# Patient Record
Sex: FEMALE
[De-identification: names, ages, dates, MRNs, and addresses within clinical notes are randomized per-mention and may not be internally consistent; named-entity substitution may affect disease eponyms.]

---

## 2023-01-28 ENCOUNTER — NON-APPOINTMENT (OUTPATIENT)
Age: 48
End: 2023-01-28

## 2023-03-07 ENCOUNTER — RX ONLY (RX ONLY)
Age: 48
End: 2023-03-07

## 2023-03-07 ENCOUNTER — OFFICE (OUTPATIENT)
Dept: URBAN - METROPOLITAN AREA CLINIC 113 | Facility: CLINIC | Age: 48
Setting detail: OPHTHALMOLOGY
End: 2023-03-07
Payer: COMMERCIAL

## 2023-03-07 DIAGNOSIS — H40.032: ICD-10-CM

## 2023-03-07 DIAGNOSIS — D31.02: ICD-10-CM

## 2023-03-07 DIAGNOSIS — H40.033: ICD-10-CM

## 2023-03-07 DIAGNOSIS — H52.7: ICD-10-CM

## 2023-03-07 DIAGNOSIS — D35.2: ICD-10-CM

## 2023-03-07 DIAGNOSIS — H40.031: ICD-10-CM

## 2023-03-07 PROCEDURE — 92250 FUNDUS PHOTOGRAPHY W/I&R: CPT | Performed by: STUDENT IN AN ORGANIZED HEALTH CARE EDUCATION/TRAINING PROGRAM

## 2023-03-07 PROCEDURE — 99204 OFFICE O/P NEW MOD 45 MIN: CPT | Performed by: STUDENT IN AN ORGANIZED HEALTH CARE EDUCATION/TRAINING PROGRAM

## 2023-03-07 PROCEDURE — 92015 DETERMINE REFRACTIVE STATE: CPT | Performed by: STUDENT IN AN ORGANIZED HEALTH CARE EDUCATION/TRAINING PROGRAM

## 2023-03-07 PROCEDURE — 92020 GONIOSCOPY: CPT | Performed by: STUDENT IN AN ORGANIZED HEALTH CARE EDUCATION/TRAINING PROGRAM

## 2023-03-07 ASSESSMENT — CONFRONTATIONAL VISUAL FIELD TEST (CVF)
OS_FINDINGS: FULL
OD_FINDINGS: FULL

## 2023-03-07 ASSESSMENT — SPHEQUIV_DERIVED
OD_SPHEQUIV: 0.625
OD_SPHEQUIV: 0.625
OS_SPHEQUIV: 0.25
OS_SPHEQUIV: 0.25

## 2023-03-07 ASSESSMENT — REFRACTION_CURRENTRX
OD_SPHERE: +2.00
OS_OVR_VA: 20/
OD_OVR_VA: 20/
OD_AXIS: 111
OS_CYLINDER: -0.25
OS_AXIS: 081
OD_CYLINDER: -0.75
OS_SPHERE: +1.25

## 2023-03-07 ASSESSMENT — REFRACTION_MANIFEST
OS_ADD: +1.00
OS_SPHERE: +1.00
OS_CYLINDER: -1.50
OS_VA2: 20/20
OD_VA1: 20/20
OS_VA1: 20/20
OD_VA2: 20/20
OD_ADD: +1.00
OD_SPHERE: +1.00
OS_AXIS: 139
OU_VA: 20/20
OD_AXIS: 067
OD_CYLINDER: -0.75

## 2023-03-07 ASSESSMENT — AXIALLENGTH_DERIVED
OS_AL: 22.6182
OS_AL: 22.6182
OD_AL: 22.6951
OD_AL: 22.6951

## 2023-03-07 ASSESSMENT — REFRACTION_AUTOREFRACTION
OD_SPHERE: +1.00
OD_CYLINDER: -0.75
OD_AXIS: 067
OS_AXIS: 139
OS_CYLINDER: -1.50
OS_SPHERE: +1.00

## 2023-03-07 ASSESSMENT — TONOMETRY
OS_IOP_MMHG: 11
OD_IOP_MMHG: 12

## 2023-03-07 ASSESSMENT — KERATOMETRY
OD_AXISANGLE_DEGREES: 117
OS_K2POWER_DIOPTERS: 46.75
OD_K2POWER_DIOPTERS: 45.75
OD_K1POWER_DIOPTERS: 45.00
OS_AXISANGLE_DEGREES: 063
OS_K1POWER_DIOPTERS: 45.25

## 2023-03-07 ASSESSMENT — VISUAL ACUITY
OD_BCVA: 20/25+1
OS_BCVA: 20/25+1

## 2023-04-27 ENCOUNTER — OFFICE (OUTPATIENT)
Dept: URBAN - METROPOLITAN AREA CLINIC 113 | Facility: CLINIC | Age: 48
Setting detail: OPHTHALMOLOGY
End: 2023-04-27
Payer: COMMERCIAL

## 2023-04-27 ENCOUNTER — RX ONLY (RX ONLY)
Age: 48
End: 2023-04-27

## 2023-04-27 DIAGNOSIS — D31.02: ICD-10-CM

## 2023-04-27 DIAGNOSIS — H11.442: ICD-10-CM

## 2023-04-27 PROCEDURE — 92285 EXTERNAL OCULAR PHOTOGRAPHY: CPT | Performed by: OPHTHALMOLOGY

## 2023-04-27 PROCEDURE — 92014 COMPRE OPH EXAM EST PT 1/>: CPT | Performed by: OPHTHALMOLOGY

## 2023-04-27 ASSESSMENT — AXIALLENGTH_DERIVED
OS_AL: 22.6182
OS_AL: 22.6182
OD_AL: 22.6951
OD_AL: 22.6951

## 2023-04-27 ASSESSMENT — TONOMETRY
OS_IOP_MMHG: 11
OD_IOP_MMHG: 11

## 2023-04-27 ASSESSMENT — KERATOMETRY
OS_AXISANGLE_DEGREES: 063
OS_K2POWER_DIOPTERS: 46.75
OD_K1POWER_DIOPTERS: 45.00
OD_AXISANGLE_DEGREES: 117
OD_K2POWER_DIOPTERS: 45.75
OS_K1POWER_DIOPTERS: 45.25

## 2023-04-27 ASSESSMENT — CONFRONTATIONAL VISUAL FIELD TEST (CVF)
OS_FINDINGS: FULL
OD_FINDINGS: FULL

## 2023-04-27 ASSESSMENT — REFRACTION_MANIFEST
OS_ADD: +1.00
OD_CYLINDER: -0.75
OD_SPHERE: +1.00
OS_SPHERE: +1.00
OS_VA2: 20/20
OD_VA1: 20/20
OU_VA: 20/20
OS_AXIS: 139
OD_ADD: +1.00
OD_AXIS: 067
OS_VA1: 20/20
OS_CYLINDER: -1.50
OD_VA2: 20/20

## 2023-04-27 ASSESSMENT — VISUAL ACUITY
OS_BCVA: 20/30
OD_BCVA: 20/20

## 2023-04-27 ASSESSMENT — REFRACTION_AUTOREFRACTION
OS_AXIS: 139
OD_CYLINDER: -0.75
OS_CYLINDER: -1.50
OS_SPHERE: +1.00
OD_AXIS: 067
OD_SPHERE: +1.00

## 2023-04-27 ASSESSMENT — REFRACTION_CURRENTRX
OS_AXIS: 081
OS_OVR_VA: 20/
OS_SPHERE: +1.25
OS_CYLINDER: -0.25
OD_AXIS: 111
OD_SPHERE: +2.00
OD_CYLINDER: -0.75
OD_OVR_VA: 20/

## 2023-04-27 ASSESSMENT — SPHEQUIV_DERIVED
OD_SPHEQUIV: 0.625
OD_SPHEQUIV: 0.625
OS_SPHEQUIV: 0.25
OS_SPHEQUIV: 0.25

## 2024-06-05 ENCOUNTER — APPOINTMENT (OUTPATIENT)
Dept: OBGYN | Facility: CLINIC | Age: 49
End: 2024-06-05
Payer: COMMERCIAL

## 2024-06-05 VITALS
WEIGHT: 145 LBS | SYSTOLIC BLOOD PRESSURE: 129 MMHG | HEIGHT: 67 IN | DIASTOLIC BLOOD PRESSURE: 76 MMHG | BODY MASS INDEX: 22.76 KG/M2 | HEART RATE: 73 BPM

## 2024-06-05 DIAGNOSIS — Z12.39 ENCOUNTER FOR OTHER SCREENING FOR MALIGNANT NEOPLASM OF BREAST: ICD-10-CM

## 2024-06-05 DIAGNOSIS — Z80.0 FAMILY HISTORY OF MALIGNANT NEOPLASM OF DIGESTIVE ORGANS: ICD-10-CM

## 2024-06-05 DIAGNOSIS — R10.2 PELVIC AND PERINEAL PAIN: ICD-10-CM

## 2024-06-05 DIAGNOSIS — Z01.419 ENCOUNTER FOR GYNECOLOGICAL EXAMINATION (GENERAL) (ROUTINE) W/OUT ABNORMAL FINDINGS: ICD-10-CM

## 2024-06-05 DIAGNOSIS — Z30.431 ENCOUNTER FOR ROUTINE CHECKING OF INTRAUTERINE CONTRACEPTIVE DEVICE: ICD-10-CM

## 2024-06-05 DIAGNOSIS — Z78.9 OTHER SPECIFIED HEALTH STATUS: ICD-10-CM

## 2024-06-05 DIAGNOSIS — Z87.891 PERSONAL HISTORY OF NICOTINE DEPENDENCE: ICD-10-CM

## 2024-06-05 DIAGNOSIS — N39.0 URINARY TRACT INFECTION, SITE NOT SPECIFIED: ICD-10-CM

## 2024-06-05 DIAGNOSIS — Z82.49 FAMILY HISTORY OF ISCHEMIC HEART DISEASE AND OTHER DISEASES OF THE CIRCULATORY SYSTEM: ICD-10-CM

## 2024-06-05 DIAGNOSIS — Z86.018 PERSONAL HISTORY OF OTHER BENIGN NEOPLASM: ICD-10-CM

## 2024-06-05 DIAGNOSIS — Z82.0 FAMILY HISTORY OF EPILEPSY AND OTHER DISEASES OF THE NERVOUS SYSTEM: ICD-10-CM

## 2024-06-05 PROBLEM — Z00.00 ENCOUNTER FOR PREVENTIVE HEALTH EXAMINATION: Status: ACTIVE | Noted: 2024-06-05

## 2024-06-05 PROCEDURE — 99386 PREV VISIT NEW AGE 40-64: CPT

## 2024-06-05 PROCEDURE — 99214 OFFICE O/P EST MOD 30 MIN: CPT | Mod: 25

## 2024-06-05 RX ORDER — ESTRADIOL 0.1 MG/G
0.1 CREAM VAGINAL
Qty: 1 | Refills: 1 | Status: ACTIVE | COMMUNITY
Start: 2024-06-05 | End: 1900-01-01

## 2024-06-07 NOTE — PLAN
[FreeTextEntry1] : Recurrent urinary tract infections.  Discussed with the patient a trial of vaginal estrogens which have been indicated to reduce the incidence of recurrent urinary tract infections.  Vaginal Estrace is prescribed.  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  We discussed typical management of prolactinoma as.  We also discussed risks and benefits of double gadolinium contrast agents used and MRIs and indications for repeat MRIs.  We spent 35 minutes in consultation.

## 2024-06-07 NOTE — HISTORY OF PRESENT ILLNESS
[FreeTextEntry1] : 49 yo  [id number]  lmp 2 weeks ago, has only had 5 periods in past 5 yrs w kyleena. lmp had severe nipple discharge pt here for gyn visit.  wants to have iud replaced.  shanice harris md wouldn't replace without letter of med necessity lives in [place] meds- cabergoline  for pituitary tumor, adderall prn has kyleena , expires in june.  nkda surg hx- rhinoplasty after broken nose had d and c  dxd w pituitary prolactinoma, dxd w lactation.  (dxd from migraines and strokelike symptoms (microadenoma)) was advised against pregnancy unless atypical  migraine issues are resolved.  sees [facility] specialist.  co recurrent uti.

## 2024-07-15 RX ORDER — MISOPROSTOL 200 UG/1
200 TABLET ORAL
Qty: 2 | Refills: 0 | Status: ACTIVE | COMMUNITY
Start: 2024-07-15 | End: 1900-01-01

## 2024-07-17 ENCOUNTER — APPOINTMENT (OUTPATIENT)
Dept: OBGYN | Facility: CLINIC | Age: 49
End: 2024-07-17
Payer: COMMERCIAL

## 2024-07-17 VITALS
SYSTOLIC BLOOD PRESSURE: 107 MMHG | DIASTOLIC BLOOD PRESSURE: 70 MMHG | HEIGHT: 67 IN | BODY MASS INDEX: 22.76 KG/M2 | WEIGHT: 145 LBS | HEART RATE: 83 BPM

## 2024-07-17 DIAGNOSIS — R68.82 DECREASED LIBIDO: ICD-10-CM

## 2024-07-17 DIAGNOSIS — N64.4 MASTODYNIA: ICD-10-CM

## 2024-07-17 LAB
HCG UR QL: NEGATIVE
QUALITY CONTROL: YES

## 2024-07-17 PROCEDURE — 58301 REMOVE INTRAUTERINE DEVICE: CPT

## 2024-07-17 PROCEDURE — 58300 INSERT INTRAUTERINE DEVICE: CPT

## 2024-07-17 PROCEDURE — 81025 URINE PREGNANCY TEST: CPT

## 2024-07-17 PROCEDURE — 99213 OFFICE O/P EST LOW 20 MIN: CPT | Mod: 25

## 2024-09-03 ENCOUNTER — APPOINTMENT (OUTPATIENT)
Dept: ANTEPARTUM | Facility: CLINIC | Age: 49
End: 2024-09-03

## 2024-09-03 ENCOUNTER — APPOINTMENT (OUTPATIENT)
Dept: OBGYN | Facility: CLINIC | Age: 49
End: 2024-09-03

## 2024-09-16 ENCOUNTER — APPOINTMENT (OUTPATIENT)
Age: 49
End: 2024-09-16

## 2025-06-16 ENCOUNTER — APPOINTMENT (OUTPATIENT)
Dept: OBGYN | Facility: CLINIC | Age: 50
End: 2025-06-16
Payer: COMMERCIAL

## 2025-06-16 VITALS
SYSTOLIC BLOOD PRESSURE: 110 MMHG | HEIGHT: 67 IN | WEIGHT: 150 LBS | BODY MASS INDEX: 23.54 KG/M2 | DIASTOLIC BLOOD PRESSURE: 60 MMHG

## 2025-06-16 PROCEDURE — 99396 PREV VISIT EST AGE 40-64: CPT

## 2025-06-19 LAB
CYTOLOGY CVX/VAG DOC THIN PREP: NORMAL
HPV HIGH+LOW RISK DNA PNL CVX: NOT DETECTED

## 2025-07-31 ENCOUNTER — APPOINTMENT (OUTPATIENT)
Dept: OBGYN | Facility: CLINIC | Age: 50
End: 2025-07-31
Payer: COMMERCIAL

## 2025-07-31 VITALS
SYSTOLIC BLOOD PRESSURE: 129 MMHG | BODY MASS INDEX: 23.86 KG/M2 | DIASTOLIC BLOOD PRESSURE: 80 MMHG | WEIGHT: 152 LBS | HEIGHT: 67 IN

## 2025-07-31 DIAGNOSIS — R10.2 PELVIC AND PERINEAL PAIN: ICD-10-CM

## 2025-07-31 DIAGNOSIS — Z30.431 ENCOUNTER FOR ROUTINE CHECKING OF INTRAUTERINE CONTRACEPTIVE DEVICE: ICD-10-CM

## 2025-07-31 DIAGNOSIS — R41.3 OTHER AMNESIA: ICD-10-CM

## 2025-07-31 DIAGNOSIS — R41.89 OTHER SYMPTOMS AND SIGNS INVOLVING COGNITIVE FUNCTIONS AND AWARENESS: ICD-10-CM

## 2025-07-31 PROCEDURE — 99215 OFFICE O/P EST HI 40 MIN: CPT

## 2025-08-27 ENCOUNTER — APPOINTMENT (OUTPATIENT)
Dept: OBGYN | Facility: CLINIC | Age: 50
End: 2025-08-27

## 2025-08-27 ENCOUNTER — ASOB RESULT (OUTPATIENT)
Age: 50
End: 2025-08-27

## 2025-08-27 ENCOUNTER — APPOINTMENT (OUTPATIENT)
Dept: ANTEPARTUM | Facility: CLINIC | Age: 50
End: 2025-08-27

## 2025-08-27 VITALS
HEIGHT: 67 IN | SYSTOLIC BLOOD PRESSURE: 97 MMHG | WEIGHT: 150 LBS | BODY MASS INDEX: 23.54 KG/M2 | DIASTOLIC BLOOD PRESSURE: 67 MMHG

## 2025-08-27 DIAGNOSIS — R41.3 OTHER AMNESIA: ICD-10-CM

## 2025-08-27 DIAGNOSIS — Z30.431 ENCOUNTER FOR ROUTINE CHECKING OF INTRAUTERINE CONTRACEPTIVE DEVICE: ICD-10-CM

## 2025-08-27 DIAGNOSIS — D21.9 BENIGN NEOPLASM OF CONNECTIVE AND OTHER SOFT TISSUE, UNSPECIFIED: ICD-10-CM

## 2025-08-27 DIAGNOSIS — R41.89 OTHER SYMPTOMS AND SIGNS INVOLVING COGNITIVE FUNCTIONS AND AWARENESS: ICD-10-CM

## 2025-08-27 DIAGNOSIS — R10.2 PELVIC AND PERINEAL PAIN: ICD-10-CM

## 2025-08-27 PROCEDURE — 76856 US EXAM PELVIC COMPLETE: CPT

## 2025-08-27 PROCEDURE — 76830 TRANSVAGINAL US NON-OB: CPT | Mod: 59

## 2025-08-27 PROCEDURE — 99214 OFFICE O/P EST MOD 30 MIN: CPT

## 2025-09-05 PROBLEM — D21.9 FIBROID: Status: ACTIVE | Noted: 2025-09-05

## 2025-09-18 ENCOUNTER — APPOINTMENT (OUTPATIENT)
Dept: RHEUMATOLOGY | Facility: CLINIC | Age: 50
End: 2025-09-18